# Patient Record
(demographics unavailable — no encounter records)

---

## 2025-01-05 NOTE — CARDIOLOGY SUMMARY
[LVSF ___%] : LV Shortening Fraction [unfilled]% [de-identified] : 1/3/25 [FreeTextEntry1] : Normal sinus rhythm @ 73 bpm [place]: 142 ms QRS: 86 ms  QTc: 416 ms P-R-T Axis (60-78-52) Normal voltage and intervals No ST segment abnormalities No pre-excitation  [de-identified] : 11/3/23 [FreeTextEntry2] :  A complete 2D, M-mode, doppler and color flow doppler transthoracic pediatric echocardiogram was performed. The intracardiac anatomy and doppler flow profiles were otherwise normal appearing with the following:   Summary: 1. Trivial tricuspid valve regurgitation. 2. Trivial pulmonary valve regurgitation. 3. Physiologic mitral valve regurgitation. 4. Normal right ventricular morphology with qualitatively normal size and systolic function. 5. Normal left ventricular size, morphology and systolic function. 6. No pericardial effusion. [de-identified] : 4/11/24 Patient had a min HR of 55 bpm, max HR of 196 bpm, and avg HR of 83 bpm. Predominant underlying rhythm was Sinus Rhythm. Isolated SVEs were rare (<1.0%, 17), and no SVE Couplets or SVE Triplets were present. No captured SVT. Isolated VEs were rare (<1.0%, 3), and no VE Couplets or VE Triplets were present. No VT. No captured arrhythmia with patient triggered events.  11/3/23 Quality of scan fair Predominant underlying rhythm: Sinus with sinus tachycardia and sinus bradycardia No captured high grade heart block Two isolated PACs (<1%)-asymptomatic. No captured SVT.  No captured ventricular ectopy No reported patient symptoms [de-identified] : 6/21/24

## 2025-01-05 NOTE — HISTORY OF PRESENT ILLNESS
[FreeTextEntry1] : Varsha is a well appearing, active 18 year old who was initially referred for evaluation of her recent episodes of palpitations limited to at rest. She presents for a follow up evaluation.  Varsha presents doing well. Asymptomatic.  Prior episodes of chest pain by description appears less likely cardiac and more consistent with costochondritis. Since resolved.   Denies any ongoing palpitations. Last visit repeat monitor ( 7 days): tolerated without concerns. Chest pain and palpitations had occurred during monitoring period per Varsha without captured arrhythmia    There has been no chest pain, palpitations, shortness of breath, dizziness, lightheadedness, syncope or near syncope.   There has been no history of exercise induced symptoms nor recent changes in exercise tolerance or activity levels.  Good appetite, remaining well hydrated. Denies excessive caffeine intake. No reported concerns with growth or development.  There has been no recent fevers, illnesses or hospitalizations. Hx of vaping.  Hx of anxiety with parent impression of hyperawareness possible at times anxiety.    Prior Palpitations description She reports 3-4 isolated episodes over the past few months. Episodes described as a "fluttering of her heart rate," which at times results in a shocking inhalation gasp. She noticed episodes after stopping her Lexapro but does not clearly recognize anxiety as a trigger. She reports episodes last a few seconds and self-resolve. Episodes only occur about once per month. Well appearing during and no associated symptoms otherwise. No identified alleviating or exacerbating qualities. Not associated with albuterol use.  Dad: Updated information per parent states PACs in teenage years.  Paternal Uncle: "arrhythmia,'-nos No additionally reported family history of an arrhythmia, aortic aneurysm, unexplained death, bicuspid aortic valve,  congenital heart disease, cardiomyopathy or sudden cardiac death, long QT syndrome, drowning or unexplained accidental death.

## 2025-01-05 NOTE — ADDENDUM
[FreeTextEntry1] : Family history update Dad: SVE during teenage years; no reported SVT. States resolved.

## 2025-01-05 NOTE — CONSULT LETTER
[Today's Date] : [unfilled] [Name] : Name: [unfilled] [] : : ~~ [Today's Date:] : [unfilled] [Dear  ___:] : Dear Dr. [unfilled]: [Consult] : I had the pleasure of evaluating your patient, [unfilled]. My full evaluation follows. [Consult - Single Provider] : Thank you very much for allowing me to participate in the care of this patient. If you have any questions, please do not hesitate to contact me. [Sincerely,] : Sincerely, [FreeTextEntry4] : Christy Mccain MD [FreeTextEntry5] : [address] [place] [FreeTextEntry6] : [place] [address] [de-identified] : Burak Baldwin DO, MPH Pediatric Cardiology [facility]

## 2025-01-05 NOTE — DISCUSSION/SUMMARY
[PE + No Restrictions] : [unfilled] may participate in the entire physical education program without restriction, including all varsity competitive sports. [FreeTextEntry1] : BRE  is a healthy, thriving 18 year old who presents without identified concerns for congestive heart failure nor impaired cardiac output by her  past medical history nor on her  current physical exam. her  EKG remains further reassuring as well as her prior echocardiogram were further reassuring.   - BRE was incidentally noted previously to have trivial tricuspid, pulmonary and physiologic mitral regurgitation in otherwise well functioning valves. This remains inaudible on physical exam and not hemodynamically significant at this time.   -Please to report prior episodes of palpitations have resolved. By her report captured episodes of chest pain during monitoring period and possible "flutter" palpitations. No identified arrhythmia or frequent ectopy on 7 day Holter monitor.     -I discussed at length with the family the causes of palpitations in children of this age group, which may represent an arrhythmia but also could simply represent an increased awareness of her  own heart beat (especially during periods of activity, dehydration, pain or anxiety, when a "stronger" heart beat may be noted). I recommended she  keep a journal of ongoing episodes and to contact our office should symptoms worsen or fail to improve. We discussed symptoms that should prompt medical attention immediately as well as reviewed symptoms of an arrhythmia.   - We discussed stretching techniques and application of a warm compress if prior symptoms of costochondritis return.  -We also discussed keeping a journal of any ongoing episodes for improved recognition of possible triggers and qualifying factors including anxiety.  -assure adequate daily hydration ( 64-80 oz of water per day), avoid caffeine. Don't smoke or vape.  -Adhere to asthma action plan; try inhaler if symptoms of chest discomfort return. Monitor for concomitant cough or wheeze.   I recommended as needed follow up or sooner pending symptoms and we reviewed signs and symptoms that should prompt medical attention and sooner evaluation. Above information explained in detail with assistance of a colored diagram.  BRE and family verbalized their understanding and all questions were answered.  [Needs SBE Prophylaxis] : [unfilled] does not need bacterial endocarditis prophylaxis

## 2025-01-05 NOTE — REVIEW OF SYSTEMS
[Feeling Poorly] : not feeling poorly (malaise) [Fever] : no fever [Pallor] : not pale [Wgt Loss (___ Lbs)] : no recent weight loss [Eye Discharge] : no eye discharge [Redness] : no redness [Change in Vision] : no change in vision [Nasal Stuffiness] : no nasal congestion [Sore Throat] : no sore throat [Earache] : no earache [Loss Of Hearing] : no hearing loss [Cyanosis] : no cyanosis [Diaphoresis] : not diaphoretic [Edema] : no edema [Chest Pain] : no chest pain or discomfort [Exercise Intolerance] : no persistence of exercise intolerance [Palpitations] : no palpitations [Orthopnea] : no orthopnea [Fast HR] : no tachycardia [Tachypnea] : not tachypneic [Wheezing] : no wheezing [Cough] : no cough [Shortness Of Breath] : not expressed as feeling short of breath [Vomiting] : no vomiting [Diarrhea] : no diarrhea [Abdominal Pain] : no abdominal pain [Decrease In Appetite] : appetite not decreased [Fainting (Syncope)] : no fainting [Seizure] : no seizures [Headache] : no headache [Dizziness] : no dizziness [Limping] : no limping [Joint Pains] : no arthralgias [Joint Swelling] : no joint swelling [Rash] : no rash [Wound problems] : no wound problems [Easy Bruising] : no tendency for easy bruising [Swollen Glands] : no lymphadenopathy [Easy Bleeding] : no ~M tendency for easy bleeding [Nosebleeds] : no epistaxis [Sleep Disturbances] : ~T no sleep disturbances [Hyperactive] : no hyperactive behavior [Depression] : no depression [Anxiety] : no anxiety [Failure To Thrive] : no failure to thrive [Short Stature] : short stature was not noted [Jitteriness] : no jitteriness [Heat/Cold Intolerance] : no temperature intolerance [Dec Urine Output] : no oliguria

## 2025-01-05 NOTE — REASON FOR VISIT
[Follow-Up] : a follow-up visit for [Chest Pain] : chest pain [Palpitations] : palpitations [Patient] : patient [Father] : father [FreeTextEntry3] : episode of light headedness

## 2025-01-05 NOTE — CARDIOLOGY SUMMARY
[LVSF ___%] : LV Shortening Fraction [unfilled]% [de-identified] : 1/3/25 [FreeTextEntry1] : Normal sinus rhythm @ 73 bpm [place]: 142 ms QRS: 86 ms  QTc: 416 ms P-R-T Axis (60-78-52) Normal voltage and intervals No ST segment abnormalities No pre-excitation  [de-identified] : 11/3/23 [FreeTextEntry2] :  A complete 2D, M-mode, doppler and color flow doppler transthoracic pediatric echocardiogram was performed. The intracardiac anatomy and doppler flow profiles were otherwise normal appearing with the following:   Summary: 1. Trivial tricuspid valve regurgitation. 2. Trivial pulmonary valve regurgitation. 3. Physiologic mitral valve regurgitation. 4. Normal right ventricular morphology with qualitatively normal size and systolic function. 5. Normal left ventricular size, morphology and systolic function. 6. No pericardial effusion. [de-identified] : 6/21/24 [de-identified] : 4/11/24 Patient had a min HR of 55 bpm, max HR of 196 bpm, and avg HR of 83 bpm. Predominant underlying rhythm was Sinus Rhythm. Isolated SVEs were rare (<1.0%, 17), and no SVE Couplets or SVE Triplets were present. No captured SVT. Isolated VEs were rare (<1.0%, 3), and no VE Couplets or VE Triplets were present. No VT. No captured arrhythmia with patient triggered events.  11/3/23 Quality of scan fair Predominant underlying rhythm: Sinus with sinus tachycardia and sinus bradycardia No captured high grade heart block Two isolated PACs (<1%)-asymptomatic. No captured SVT.  No captured ventricular ectopy No reported patient symptoms

## 2025-01-05 NOTE — REVIEW OF SYSTEMS
[Feeling Poorly] : not feeling poorly (malaise) [Fever] : no fever [Pallor] : not pale [Wgt Loss (___ Lbs)] : no recent weight loss [Eye Discharge] : no eye discharge [Redness] : no redness [Change in Vision] : no change in vision [Nasal Stuffiness] : no nasal congestion [Sore Throat] : no sore throat [Earache] : no earache [Loss Of Hearing] : no hearing loss [Cyanosis] : no cyanosis [Diaphoresis] : not diaphoretic [Edema] : no edema [Chest Pain] : no chest pain or discomfort [Exercise Intolerance] : no persistence of exercise intolerance [Palpitations] : no palpitations [Orthopnea] : no orthopnea [Fast HR] : no tachycardia [Tachypnea] : not tachypneic [Wheezing] : no wheezing [Cough] : no cough [Shortness Of Breath] : not expressed as feeling short of breath [Vomiting] : no vomiting [Diarrhea] : no diarrhea [Abdominal Pain] : no abdominal pain [Decrease In Appetite] : appetite not decreased [Fainting (Syncope)] : no fainting [Seizure] : no seizures [Headache] : no headache [Dizziness] : no dizziness [Joint Pains] : no arthralgias [Limping] : no limping [Joint Swelling] : no joint swelling [Rash] : no rash [Wound problems] : no wound problems [Easy Bruising] : no tendency for easy bruising [Swollen Glands] : no lymphadenopathy [Easy Bleeding] : no ~M tendency for easy bleeding [Nosebleeds] : no epistaxis [Sleep Disturbances] : ~T no sleep disturbances [Hyperactive] : no hyperactive behavior [Depression] : no depression [Anxiety] : no anxiety [Failure To Thrive] : no failure to thrive [Short Stature] : short stature was not noted [Jitteriness] : no jitteriness [Heat/Cold Intolerance] : no temperature intolerance [Dec Urine Output] : no oliguria

## 2025-01-31 NOTE — HISTORY OF PRESENT ILLNESS
[de-identified] : Pt feels a painful lump behind neck and on the right side of neck, following body ache. Was tested negative for covid and flu [FreeTextEntry6] : had no cough congestion  only sore throat 2 days and myalgias lasted 2 days and then went away out of no where per patient noticed lump right side of neck aftter that happened and left back of head where it meets the neck stated hurting yesterday able to move neck in all the directions but it does reproduce pain to the left back of the neck no truama no other symptoms no fever

## 2025-01-31 NOTE — PHYSICAL EXAM
[TextEntry] : General: awake, alert, cooperative, appropriate, no acute distress Head: no signs injury Eyes: EOMI, PERRL, no discharge, no conjunctival or scleral erythema  Ears: tympanic membranes clear bilaterally without erythema or purulent effusion, normal light reflex Nose: +rhinorrhea, inflamed nasal turbinates bilaterally Mouth: mucosa moist and pink, +mild erythema to the oropharynx, no exudates, vesicles, lesions or soft palate petechiae Neck: supple, good range of motion, no nuchal rigidity Lungs: clear to auscultation bilaterally  Cardiac: normal S1 S2, regular rate and rhythm Abdomen: soft, non tender, non distended Lymphatics: shotty cervical lymphadenopathy b/L with 1cm node on the right anterior area, and left occipital tenderness with mild swelling  Skin: no rash

## 2025-05-12 NOTE — PLAN
[TextEntry] : Rapid strep test was negative. Patient to take tylenol or motrin as directed for pain or discomfort. Increase fluids, and rest. Follow up if symptoms worsen or persist. If culture positive to start azith zpak x 1

## 2025-05-12 NOTE — PHYSICAL EXAM
[TextEntry] : General: awake, alert, cooperative, appropriate, no acute distress Head: no signs injury Eyes: EOMI, PERRL, no discharge, no conjunctival or scleral erythema  Ears: tympanic membranes clear bilaterally without erythema or purulent effusion, normal light reflex Nose: +rhinorrhea, inflamed nasal turbinates bilaterally, no maxillary or frontal sinus tenderness Mouth: mucosa moist and pink, +mild erythema to the oropharynx, no exudates, vesicles, lesions or soft palate petechiae Neck: supple, good range of motion Lungs: clear to auscultation bilaterally  Cardiac: normal S1 S2, regular rate and rhythm Abdomen: soft, non tender, non distended Lymphatics: no cervical lymphadenopathy, no pre or post auricular lymphadenopathy, no occipital lymphadenopathy Skin: no rash

## 2025-05-12 NOTE — HISTORY OF PRESENT ILLNESS
[de-identified] : Pt reports she woke up with a sore throat this morning and has a headache for 2 days. Pt also c/o belly pain. Denies N/V/D, rashes or fevers. [FreeTextEntry6] :   - History of Present Illness (HPI): Patient reports sore throat since this morning, accompanied by nasal congestion and intermittent runny nose. She experienced headaches two days ago and yesterday, which is unusual for her. Stomach pain was present yesterday. Patient denies known exposure to sick individuals. Recent history of swollen lymph nodes that resolved a week ago. No fever reported. no distress eating and acting well also needs titers for nursing school

## 2025-06-03 NOTE — PHYSICAL EXAM
[Alert] : alert [No Acute Distress] : no acute distress [Normocephalic] : normocephalic [EOMI Bilateral] : EOMI bilateral [Clear tympanic membranes with bony landmarks and light reflex present bilaterally] : clear tympanic membranes with bony landmarks and light reflex present bilaterally  [Pink Nasal Mucosa] : pink nasal mucosa [Nonerythematous Oropharynx] : nonerythematous oropharynx [Supple, full passive range of motion] : supple, full passive range of motion [No Palpable Masses] : no palpable masses [Clear to Auscultation Bilaterally] : clear to auscultation bilaterally [Regular Rate and Rhythm] : regular rate and rhythm [Normal S1, S2 audible] : normal S1, S2 audible [No Murmurs] : no murmurs [+2 Femoral Pulses] : +2 femoral pulses [Soft] : soft [NonTender] : non tender [Non Distended] : non distended [Normoactive Bowel Sounds] : normoactive bowel sounds [No Hepatomegaly] : no hepatomegaly [No Splenomegaly] : no splenomegaly [No Abnormal Lymph Nodes Palpated] : no abnormal lymph nodes palpated [Normal Muscle Tone] : normal muscle tone [No Gait Asymmetry] : no gait asymmetry [No pain or deformities with palpation of bone, muscles, joints] : no pain or deformities with palpation of bone, muscles, joints [Straight] : straight [+2 Patella DTR] : +2 patella DTR [Cranial Nerves Grossly Intact] : cranial nerves grossly intact [No Rash or Lesions] : no rash or lesions [Vivek: ____] : Vivek [unfilled] [Vivek: _____] : Vivek [unfilled] [de-identified] : declned [de-identified] : FROM lower back, no pain with vertebral palpation, no pain with flexion/extension or rotation, normal gait

## 2025-06-03 NOTE — HISTORY OF PRESENT ILLNESS
[Yes] : Patient goes to dentist yearly [Up to date] : Up to date [Normal] : normal [Eats meals with family] : eats meals with family [Normal Performance] : normal performance [Eats regular meals including adequate fruits and vegetables] : eats regular meals including adequate fruits and vegetables [Has friends] : has friends [Screen time (except homework) less than 2 hours a day] : screen time (except homework) less than 2 hours a day [Uses safety belts/safety equipment] : uses safety belts/safety equipment  [No] : Patient has not had sexual intercourse. [Has ways to cope with stress] : has ways to cope with stress [Sleep Concerns] : no sleep concerns [Uses electronic nicotine delivery system] : does not use electronic nicotine delivery system [Exposure to electronic nicotine delivery system] : no exposure to electronic nicotine delivery system [Uses tobacco] : does not use tobacco [Exposure to tobacco] : no exposure to tobacco [Uses drugs] : does not use drugs  [Exposure to drugs] : no exposure to drugs [Drinks alcohol] : does not drink alcohol [Exposure to alcohol] : no exposure to alcohol [Gets depressed, anxious, or irritable/has mood swings] : does not get depressed, anxious, or irritable/has mood swings [de-identified] : self [FreeTextEntry7] : 18 yr c [FreeTextEntry8] : followed by [facility] [FreeTextEntry1] : 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  Pt saw cardiology- 1/2025- no concerns, no f/u needed. Pt has upcoming allergist apt- allergy to multiple antibiotics.  anxiety- followed by therapist monthly asthma- using albuterol every few months per pt, using with exercise, no recent spirometry  new concern: c/o low back pain- possible injury at gym, muscle pain, denies numbness or tingling to feet.

## 2025-06-03 NOTE — DISCUSSION/SUMMARY
[] : The components of the vaccine(s) to be administered today are listed in the plan of care. The disease(s) for which the vaccine(s) are intended to prevent and the risks have been discussed with the caretaker.  The risks are also included in the appropriate vaccination information statements which have been provided to the patient's caregiver.  The caregiver has given consent to vaccinate. [FreeTextEntry1] : D/W pt well visit, reviewed nutrition/exercise, encourage safety- bike/ski helmet, seatbelt, sunblock, water safety; avoid alcohol/drug/tobacco use; reviewed condom use/chlamydia screens once sexually active; advise routine dental care; reviewed and consented for vaccinations today, advise lipid screen at 18yrs of age; reviewed menses and first Pap/Pelvic with OBGYN at 21yrs old. phq9 and horacet reviewed f/u with specialist as planned.  asthma- spirometry normal, continue albuterol prn use.  D/W pt most likely muscle strain, advise heating pad/rest/ibuprofen prn; reviewed most sprains take 7-14days to resolve; if continued concerns occur then f/u for further evaluation. time spent: 15min

## 2025-06-03 NOTE — RISK ASSESSMENT
[Yes] : Discussed with patient. [No] : Patient does not consent to screening. [Have you ever fainted, passed out or had an unexplained seizure suddenly and without warning, especially during exercise or in response] : Have you ever fainted, passed out or had an unexplained seizure suddenly and without warning, especially during exercise or in response to sudden loud noises such as doorbells, alarm clocks and ringing telephones? No [Have you ever had exercise-related chest pain or shortness of breath?] : Have you ever had exercise-related chest pain or shortness of breath? No [Has anyone in your immediate family (parents, grandparents, siblings) or other more distant relatives (aunts, uncles, cousins)  of heart] : Has anyone in your immediate family (parents, grandparents, siblings) or other more distant relatives (aunts, uncles, cousins)  of heart problems or had an unexpected sudden death before age 50 (This would include unexpected drownings, unexplained car accidents in which the relative was driving or sudden infant death syndrome.)? No [Are you related to anyone with hypertrophic cardiomyopathy or hypertrophic obstructive cardiomyopathy, Marfan syndrome, arrhythmogenic] : Are you related to anyone with hypertrophic cardiomyopathy or hypertrophic obstructive cardiomyopathy, Marfan syndrome, arrhythmogenic right ventricular cardiomyopathy, long QT syndrome, short QT syndrome, Brugada syndrome or catecholaminergic polymorphic ventricular tachycardia, or anyone younger than 50 years with a pacemaker or implantable defibrillator? Yes

## 2025-06-04 NOTE — DISCUSSION/SUMMARY
[FreeTextEntry1] :  D/W caregiver viral URI- recommend supportive care including antipyretics, fluids, and nasal saline followed by nasal suction. Return if symptoms worsen or persist.  COVID-19 and flu rapid negative today-. Answered patient questions about COVID-19 including signs and symptoms, self home care and proper isolation precautions. D/W caregiver constipation- advise toilet sitting after meals and start MiraLAX OTC 1capfull in 6oz juice water daily, encourage fiber in diet, increase water intake and call if not improving for recheck. time spent: 30min

## 2025-06-04 NOTE — HISTORY OF PRESENT ILLNESS
[de-identified] : Pt is c/o sinus pressure cough, body aches and can't taste for a few days. Pt mentioned she had a fever 99 [FreeTextEntry6] :  + congestion and cough X2days, + headache today, + body aches X 1day, no ST, no ear pain, no n/v/d, eating and drinking well, normal voiding. afebrile, + chills + firm stool yesterday- blood when wiping yesterday

## 2025-06-04 NOTE — REVIEW OF SYSTEMS
[Fever] : no fever [Chills] : chills [Headache] : headache [Nasal Congestion] : nasal congestion [Sore Throat] : no sore throat [Cough] : cough [Vomiting] : no vomiting [Diarrhea] : no diarrhea [Constipation] : constipation